# Patient Record
Sex: FEMALE | Race: WHITE | URBAN - METROPOLITAN AREA
[De-identification: names, ages, dates, MRNs, and addresses within clinical notes are randomized per-mention and may not be internally consistent; named-entity substitution may affect disease eponyms.]

---

## 2023-07-10 ENCOUNTER — NURSING HOME VISIT (OUTPATIENT)
Dept: POST ACUTE CARE | Facility: EXTERNAL LOCATION | Age: 68
End: 2023-07-10
Payer: MEDICARE

## 2023-07-10 DIAGNOSIS — I10 PRIMARY HYPERTENSION: ICD-10-CM

## 2023-07-10 DIAGNOSIS — S32.009A CLOSED FRACTURE OF LUMBAR VERTEBRA WITHOUT SPINAL CORD INJURY, INITIAL ENCOUNTER (MULTI): Primary | ICD-10-CM

## 2023-07-10 DIAGNOSIS — R53.1 WEAKNESS: ICD-10-CM

## 2023-07-10 DIAGNOSIS — F41.9 ANXIETY: ICD-10-CM

## 2023-07-10 DIAGNOSIS — F32.0 CURRENT MILD EPISODE OF MAJOR DEPRESSIVE DISORDER WITHOUT PRIOR EPISODE (CMS-HCC): ICD-10-CM

## 2023-07-10 PROCEDURE — 99305 1ST NF CARE MODERATE MDM 35: CPT | Performed by: INTERNAL MEDICINE

## 2023-07-10 NOTE — LETTER
Patient: Cece Garcia  : 1955    Encounter Date: 07/10/2023    HISTORY & PHYSICAL    Subjective  Chief complaint: Cece Garcia is a 67 y.o. female who is a acute skilled care patient being seen and evaluated for multiple medical problems.  Patient presents for weakness.    HPI:  Patient has a past medical history of anxiety, depression, and HTN. Patient was recently admitted to skilled nursing facility for wedge compression fractures in her vertebra.  The pain is severe at times it sharp it is off and on increased with movement no radiation 5-7/10 patient will be in therapy working towards her goals and gaining strength.         Past Medical History:   Diagnosis Date   • Anxiety    • Depression    • Hypertension, essential    • Weakness    • Wedge compression fracture of third lumbar vertebra with routine healing        No past surgical history on file.    Family History   Problem Relation Name Age of Onset   • No Known Problems Mother     • No Known Problems Father         Social History     Socioeconomic History   • Marital status: Single     Spouse name: Not on file   • Number of children: Not on file   • Years of education: Not on file   • Highest education level: Not on file   Occupational History   • Not on file   Tobacco Use   • Smoking status: Not on file   • Smokeless tobacco: Not on file   Substance and Sexual Activity   • Alcohol use: Not on file   • Drug use: Not on file   • Sexual activity: Not on file   Other Topics Concern   • Not on file   Social History Narrative   • Not on file     Social Determinants of Health     Financial Resource Strain: Not on file   Food Insecurity: Not on file   Transportation Needs: Not on file   Physical Activity: Not on file   Stress: Not on file   Social Connections: Not on file   Intimate Partner Violence: Not on file   Housing Stability: Not on file       Vital signs: 119/65, 97.8, 75, 19, 98%    Objective  Physical Exam  Vitals reviewed.   Constitutional:        Appearance: Normal appearance.   HENT:      Head: Normocephalic and atraumatic.   Cardiovascular:      Rate and Rhythm: Normal rate and regular rhythm.   Pulmonary:      Effort: Pulmonary effort is normal.      Breath sounds: Normal breath sounds.   Abdominal:      General: Bowel sounds are normal.      Palpations: Abdomen is soft.   Musculoskeletal:      Cervical back: Neck supple.      Comments: Tender middle back   Skin:     General: Skin is warm and dry.   Neurological:      General: No focal deficit present.      Mental Status: She is alert.   Psychiatric:         Mood and Affect: Mood normal.         Behavior: Behavior is cooperative.         Assessment/Plan  Problem List Items Addressed This Visit    None  Visit Diagnoses       Closed fracture of lumbar vertebra without spinal cord injury, initial encounter (CMS/[facility])    -  Primary    Primary hypertension        Current mild episode of major depressive disorder without prior episode (CMS/[facility])        Anxiety        Weakness              Medications, treatments, and labs reviewed  Continue medications and treatments as listed in [facility]    Scribe Attestation  Diane PENA Scribe   attest that this documentation has been prepared under the direction and in the presence of Hayley Mckee MD.    Provider Attestation - Scribe documentation  All medical record entries made by the Scribe were at my direction and personally dictated by me. I have reviewed the chart and agree that the record accurately reflects my personal performance of the history, physical exam, discussion and plan.    Hayley Mckee MD          Electronically Signed By: Hayley Mckee MD   7/10/23  2:56 PM

## 2023-07-11 ENCOUNTER — NURSING HOME VISIT (OUTPATIENT)
Dept: POST ACUTE CARE | Facility: EXTERNAL LOCATION | Age: 68
End: 2023-07-11
Payer: MEDICARE

## 2023-07-11 DIAGNOSIS — S32.030D CLOSED WEDGE COMPRESSION FRACTURE OF L3 VERTEBRA WITH ROUTINE HEALING: ICD-10-CM

## 2023-07-11 DIAGNOSIS — R53.1 WEAKNESS: Primary | ICD-10-CM

## 2023-07-11 PROCEDURE — 99308 SBSQ NF CARE LOW MDM 20: CPT | Performed by: REGISTERED NURSE

## 2023-07-11 NOTE — LETTER
Patient: Cece Garcia  : 1955    Encounter Date: 2023    PROGRESS NOTE    Subjective  Chief complaint: Cece Garcia is a 67 y.o. female who is a acute skilled care patient being seen and evaluated for weakness.    HPI:  7/10/23  Patient has a past medical history of anxiety, depression, and HTN. Patient was recently admitted to skilled nursing facility for wedge compression fractures in her vertebra.  The pain is severe at times it sharp it is off and on increased with movement no radiation -7/10 patient will be in therapy working towards her goals and gaining strength.     23   Patient admitted to SNF for therapy d/t weakness after recent hospitalization.   Patient requires assist with ADLs and transfers.  Therapy working with patient to improve strength and functional mobility.  Patient able to ambulate 42 feet with FWW and CGA.  Requires CGA for transfers and SBA for lower body ADLs.  Is moderately independent for upper body ADLs.        Objective  Vital signs:  17, 101/73, 96.8, 86, 96%  Physical Exam  Constitutional:       General: She is not in acute distress.  Eyes:      Extraocular Movements: Extraocular movements intact.   Pulmonary:      Effort: Pulmonary effort is normal.   Musculoskeletal:      Cervical back: Neck supple.   Neurological:      Mental Status: She is alert.   Psychiatric:         Mood and Affect: Mood normal.         Behavior: Behavior is cooperative.         Assessment/Plan  Problem List Items Addressed This Visit       Weakness - Primary     Cont pt/ot          Closed wedge compression fracture of L3 vertebra with routine healing     Cont current meds   FU with Ortho  Cont therapy           Medications, treatments, and labs reviewed  Continue medications and treatments as listed in EMR    Scribe Attestation  I, Zayda Roque   attest that this documentation has been prepared under the direction and in the presence of Kevin Lara,  APRN-CNP    Provider Attestation - Scribe documentation  All medical record entries made by the Scribe were at my direction and personally dictated by me. I have reviewed the chart and agree that the record accurately reflects my personal performance of the history, physical exam, discussion and plan.   JONNIE Berkowitz        Electronically Signed By: JONNIE Berkowitz   8/24/23 10:51 AM

## 2023-07-12 ENCOUNTER — NURSING HOME VISIT (OUTPATIENT)
Dept: POST ACUTE CARE | Facility: EXTERNAL LOCATION | Age: 68
End: 2023-07-12
Payer: MEDICARE

## 2023-07-12 DIAGNOSIS — S32.030D CLOSED WEDGE COMPRESSION FRACTURE OF L3 VERTEBRA WITH ROUTINE HEALING: ICD-10-CM

## 2023-07-12 DIAGNOSIS — R53.1 WEAKNESS: Primary | ICD-10-CM

## 2023-07-12 PROCEDURE — 99308 SBSQ NF CARE LOW MDM 20: CPT | Performed by: REGISTERED NURSE

## 2023-07-12 NOTE — LETTER
Patient: Cece Garcia  : 1955    Encounter Date: 2023    PROGRESS NOTE    Subjective  Chief complaint: Cece Garcia is a 67 y.o. female who is a acute skilled care patient being seen and evaluated for weakness.    HPI:  7/10/23  Patient has a past medical history of anxiety, depression, and HTN. Patient was recently admitted to skilled nursing facility for wedge compression fractures in her vertebra.  The pain is severe at times it sharp it is off and on increased with movement no radiation -7/10 patient will be in therapy working towards her goals and gaining strength.     23   Patient admitted to SNF for therapy d/t weakness after recent hospitalization.   Patient requires assist with ADLs and transfers.  Therapy working with patient to improve strength and functional mobility.  Patient able to ambulate 42 feet with FWW and CGA.  Requires CGA for transfers and SBA for lower body ADLs.  Is moderately independent for upper body ADLs.      23    Patient in therapy d/t generalized weakness.  Patient presents for f/u.  Continues to work toward goals in therapy.  Patient has no new complaints at this time.  No new concerns reported by nursing.      Objective  Vital signs: 109/66  Physical Exam  Constitutional:       General: She is not in acute distress.  Eyes:      Extraocular Movements: Extraocular movements intact.   Pulmonary:      Effort: Pulmonary effort is normal.   Musculoskeletal:      Cervical back: Neck supple.   Neurological:      Mental Status: She is alert.   Psychiatric:         Mood and Affect: Mood normal.         Behavior: Behavior is cooperative.         Assessment/Plan  Problem List Items Addressed This Visit       Weakness - Primary     Cont pt/ot          Closed wedge compression fracture of L3 vertebra with routine healing     Cont current meds   FU with Ortho  Cont therapy           Medications, treatments, and labs reviewed  Continue medications and treatments as  listed in EMR    Scribe Attestation  I, Zayda Roque   attest that this documentation has been prepared under the direction and in the presence of JONNIE Berkowitz    Provider Attestation - Scribe documentation  All medical record entries made by the Scribe were at my direction and personally dictated by me. I have reviewed the chart and agree that the record accurately reflects my personal performance of the history, physical exam, discussion and plan.   JONNIE Berkowitz        Electronically Signed By: JONNIE Berkowitz   8/28/23 11:20 AM

## 2023-07-13 ENCOUNTER — NURSING HOME VISIT (OUTPATIENT)
Dept: POST ACUTE CARE | Facility: EXTERNAL LOCATION | Age: 68
End: 2023-07-13
Payer: MEDICARE

## 2023-07-13 DIAGNOSIS — S32.030D CLOSED WEDGE COMPRESSION FRACTURE OF L3 VERTEBRA WITH ROUTINE HEALING: ICD-10-CM

## 2023-07-13 DIAGNOSIS — R53.1 WEAKNESS: ICD-10-CM

## 2023-07-13 PROCEDURE — 99308 SBSQ NF CARE LOW MDM 20: CPT | Performed by: INTERNAL MEDICINE

## 2023-07-13 NOTE — LETTER
Patient: Cece Garcia  : 1955    Encounter Date: 2023    PROGRESS NOTE    Subjective  Chief complaint: Cece Gacria is a 67 y.o. female who is an acute skilled patient being seen and evaluated for weakness    HPI:  Patient recently admitted to nursing facility for therapy. She requires assistance for transfers, ADL's and mobility. Pain associated with fxs in her spine can be severe at time but therapy and pain meds mostly effective. No new issues at this time. No acute distress.       Objective  Vital signs: 127/78, 97%    Physical Exam  Constitutional:       General: She is not in acute distress.  Eyes:      Extraocular Movements: Extraocular movements intact.   Cardiovascular:      Rate and Rhythm: Normal rate and regular rhythm.   Pulmonary:      Effort: Pulmonary effort is normal.      Breath sounds: Normal breath sounds.   Abdominal:      General: Bowel sounds are normal.      Palpations: Abdomen is soft.   Musculoskeletal:      Cervical back: Neck supple.      Right lower leg: No edema.      Left lower leg: No edema.   Neurological:      Mental Status: She is alert.   Psychiatric:         Mood and Affect: Mood normal.         Behavior: Behavior is cooperative.         Assessment/Plan  Problem List Items Addressed This Visit          Neuro    Closed wedge compression fracture of L3 vertebra with routine healing     Cont current meds   FU with Ortho  Cont therapy            Symptoms and Signs    Weakness     Continue therapy          Medications, treatments, and labs reviewed  Continue medications and treatments as listed in [facility]    Hayley Mckee MD    1. Weakness        2. Closed wedge compression fracture of L3 vertebra with routine healing             Scribe Attestation  By signing my name below, I, Sarai Rod, Scribe   attest that this documentation has been prepared under the direction and in the presence of Hayley Mckee MD.    Provider Attestation - Scribe documentation  All medical  record entries made by the Scribe were at my direction and personally dictated by me. I have reviewed the chart and agree that the record accurately reflects my personal performance of the history, physical exam, discussion and plan.  1. Weakness        2. Closed wedge compression fracture of L3 vertebra with routine healing              Electronically Signed By: Hayley Mckee MD   7/13/23  5:41 PM

## 2023-07-13 NOTE — PROGRESS NOTES
PROGRESS NOTE    Subjective   Chief complaint: Cece Garcia is a 67 y.o. female who is an acute skilled patient being seen and evaluated for weakness    HPI:  Patient recently admitted to nursing facility for therapy. She requires assistance for transfers, ADL's and mobility. Pain associated with fxs in her spine can be severe at time but therapy and pain meds mostly effective. No new issues at this time. No acute distress.       Objective   Vital signs: 127/78, 97%    Physical Exam  Constitutional:       General: She is not in acute distress.  Eyes:      Extraocular Movements: Extraocular movements intact.   Cardiovascular:      Rate and Rhythm: Normal rate and regular rhythm.   Pulmonary:      Effort: Pulmonary effort is normal.      Breath sounds: Normal breath sounds.   Abdominal:      General: Bowel sounds are normal.      Palpations: Abdomen is soft.   Musculoskeletal:      Cervical back: Neck supple.      Right lower leg: No edema.      Left lower leg: No edema.   Neurological:      Mental Status: She is alert.   Psychiatric:         Mood and Affect: Mood normal.         Behavior: Behavior is cooperative.         Assessment/Plan   Problem List Items Addressed This Visit          Neuro    Closed wedge compression fracture of L3 vertebra with routine healing     Cont current meds   FU with Ortho  Cont therapy            Symptoms and Signs    Weakness     Continue therapy          Medications, treatments, and labs reviewed  Continue medications and treatments as listed in [facility]    Hayley Mckee MD    1. Weakness        2. Closed wedge compression fracture of L3 vertebra with routine healing             Scribe Attestation  By signing my name below, Sarai PENA Scribe   attest that this documentation has been prepared under the direction and in the presence of Hayley Mckee MD.    Provider Attestation - Scribe documentation  All medical record entries made by the Scribe were at my direction and personally  dictated by me. I have reviewed the chart and agree that the record accurately reflects my personal performance of the history, physical exam, discussion and plan.  1. Weakness        2. Closed wedge compression fracture of L3 vertebra with routine healing

## 2023-07-14 ENCOUNTER — NURSING HOME VISIT (OUTPATIENT)
Dept: POST ACUTE CARE | Facility: EXTERNAL LOCATION | Age: 68
End: 2023-07-14
Payer: MEDICARE

## 2023-07-14 DIAGNOSIS — S32.030D CLOSED WEDGE COMPRESSION FRACTURE OF L3 VERTEBRA WITH ROUTINE HEALING: Primary | ICD-10-CM

## 2023-07-14 DIAGNOSIS — R53.1 WEAKNESS: ICD-10-CM

## 2023-07-14 DIAGNOSIS — I10 PRIMARY HYPERTENSION: ICD-10-CM

## 2023-07-14 PROCEDURE — 99316 NF DSCHRG MGMT 30 MIN+: CPT | Performed by: REGISTERED NURSE

## 2023-07-14 NOTE — LETTER
Patient: Cece Garcia  : 1955    Encounter Date: 2023    PROGRESS NOTE    Subjective  Chief complaint: Cece Garcia is a 67 y.o. female who is a acute skilled care patient being seen and evaluated for weakness.    HPI:  7/10/23  Patient has a past medical history of anxiety, depression, and HTN. Patient was recently admitted to skilled nursing facility for wedge compression fractures in her vertebra.  The pain is severe at times it sharp it is off and on increased with movement no radiation -7/10 patient will be in therapy working towards her goals and gaining strength.     23   Patient admitted to SNF for therapy d/t weakness after recent hospitalization.   Patient requires assist with ADLs and transfers.  Therapy working with patient to improve strength and functional mobility.  Patient able to ambulate 42 feet with FWW and CGA.  Requires CGA for transfers and SBA for lower body ADLs.  Is moderately independent for upper body ADLs.      23    Patient in therapy d/t generalized weakness.  Patient presents for f/u.  Continues to work toward goals in therapy.  Patient has no new complaints at this time.  No new concerns reported by nursing.    23  Patient recently admitted to nursing facility for therapy. She requires assistance for transfers, ADL's and mobility. Pain associated with fxs in her spine can be severe at time but therapy and pain meds mostly effective. No new issues at this time. No acute distress.     23   Patient has been working in therapy.  Planning to discharge home today .  Patient has no acute concerns or questions.  No new concerns reported by nursing.      Objective  Vital signs: 136/71  Physical Exam  Constitutional:       General: She is not in acute distress.  Eyes:      Extraocular Movements: Extraocular movements intact.   Pulmonary:      Effort: Pulmonary effort is normal.   Musculoskeletal:      Cervical back: Neck supple.      Comments: Generalized  weakness   Neurological:      Mental Status: She is alert.   Psychiatric:         Mood and Affect: Mood normal.         Behavior: Behavior is cooperative.       Admitting/DC Diagnoses  Assessment/Plan  Problem List Items Addressed This Visit       Weakness     Cont pt/ot          Closed wedge compression fracture of L3 vertebra with routine healing - Primary     Cont current meds   FU with Ortho  Cont therapy          Primary hypertension     BP at goal monitor          Prognosis - Fair  Course - PT/OT  Plan - DC home with home health nursing and therapy    Medications, treatments, and labs reviewed  Continue medications and treatments as listed in EMR    Scribe Attestation  IEva Scribe   attest that this documentation has been prepared under the direction and in the presence of JONNIE Berkowitz    Provider Attestation - Scribe documentation  All medical record entries made by the Scribe were at my direction and personally dictated by me. I have reviewed the chart and agree that the record accurately reflects my personal performance of the history, physical exam, discussion and plan.   JONNIE Berkowitz        Electronically Signed By: JONNIE Berkowitz   8/28/23  6:26 PM

## 2023-08-09 DIAGNOSIS — S32.030D CLOSED WEDGE COMPRESSION FRACTURE OF L3 VERTEBRA WITH ROUTINE HEALING: Primary | ICD-10-CM

## 2023-08-16 RX ORDER — BACLOFEN 5 MG/1
5 TABLET ORAL 3 TIMES DAILY
Qty: 90 TABLET | Refills: 0 | Status: SHIPPED | OUTPATIENT
Start: 2023-08-16 | End: 2024-03-06

## 2023-08-23 NOTE — PROGRESS NOTES
PROGRESS NOTE    Subjective   Chief complaint: Cece Garcia is a 67 y.o. female who is a acute skilled care patient being seen and evaluated for weakness.    HPI:  7/10/23  Patient has a past medical history of anxiety, depression, and HTN. Patient was recently admitted to skilled nursing facility for wedge compression fractures in her vertebra.  The pain is severe at times it sharp it is off and on increased with movement no radiation 5-7/10 patient will be in therapy working towards her goals and gaining strength.     7/11/23   Patient admitted to SNF for therapy d/t weakness after recent hospitalization.   Patient requires assist with ADLs and transfers.  Therapy working with patient to improve strength and functional mobility.  Patient able to ambulate 42 feet with FWW and CGA.  Requires CGA for transfers and SBA for lower body ADLs.  Is moderately independent for upper body ADLs.        Objective   Vital signs:  17, 101/73, 96.8, 86, 96%  Physical Exam  Constitutional:       General: She is not in acute distress.  Eyes:      Extraocular Movements: Extraocular movements intact.   Pulmonary:      Effort: Pulmonary effort is normal.   Musculoskeletal:      Cervical back: Neck supple.   Neurological:      Mental Status: She is alert.   Psychiatric:         Mood and Affect: Mood normal.         Behavior: Behavior is cooperative.         Assessment/Plan   Problem List Items Addressed This Visit       Weakness - Primary     Cont pt/ot          Closed wedge compression fracture of L3 vertebra with routine healing     Cont current meds   FU with Ortho  Cont therapy           Medications, treatments, and labs reviewed  Continue medications and treatments as listed in EMR    Scribe Attestation  I, Zayda Roque   attest that this documentation has been prepared under the direction and in the presence of JONNIE Berkowitz    Provider Attestation - Scribe documentation  All medical record entries made  by the Scribe were at my direction and personally dictated by me. I have reviewed the chart and agree that the record accurately reflects my personal performance of the history, physical exam, discussion and plan.   Kevin Lara, APRN-CNP

## 2023-08-25 ENCOUNTER — NURSING HOME VISIT (OUTPATIENT)
Dept: POST ACUTE CARE | Facility: EXTERNAL LOCATION | Age: 68
End: 2023-08-25
Payer: MEDICARE

## 2023-08-25 DIAGNOSIS — S32.030D CLOSED WEDGE COMPRESSION FRACTURE OF L3 VERTEBRA WITH ROUTINE HEALING: ICD-10-CM

## 2023-08-25 DIAGNOSIS — R53.1 WEAKNESS: Primary | ICD-10-CM

## 2023-08-25 DIAGNOSIS — M48.062 SPINAL STENOSIS OF LUMBAR REGION WITH NEUROGENIC CLAUDICATION: ICD-10-CM

## 2023-08-25 DIAGNOSIS — I10 PRIMARY HYPERTENSION: ICD-10-CM

## 2023-08-25 PROCEDURE — 99308 SBSQ NF CARE LOW MDM 20: CPT | Performed by: REGISTERED NURSE

## 2023-08-25 NOTE — PROGRESS NOTES
PROGRESS NOTE    Subjective   Chief complaint: Cece Garcia is a 67 y.o. female who is a acute skilled care patient being seen and evaluated for weakness.    HPI:  7/10/23  Patient has a past medical history of anxiety, depression, and HTN. Patient was recently admitted to skilled nursing facility for wedge compression fractures in her vertebra.  The pain is severe at times it sharp it is off and on increased with movement no radiation 5-7/10 patient will be in therapy working towards her goals and gaining strength.     7/11/23   Patient admitted to SNF for therapy d/t weakness after recent hospitalization.   Patient requires assist with ADLs and transfers.  Therapy working with patient to improve strength and functional mobility.  Patient able to ambulate 42 feet with FWW and CGA.  Requires CGA for transfers and SBA for lower body ADLs.  Is moderately independent for upper body ADLs.      7/12/23    Patient in therapy d/t generalized weakness.  Patient presents for f/u.  Continues to work toward goals in therapy.  Patient has no new complaints at this time.  No new concerns reported by nursing.      Objective   Vital signs: 109/66  Physical Exam  Constitutional:       General: She is not in acute distress.  Eyes:      Extraocular Movements: Extraocular movements intact.   Pulmonary:      Effort: Pulmonary effort is normal.   Musculoskeletal:      Cervical back: Neck supple.   Neurological:      Mental Status: She is alert.   Psychiatric:         Mood and Affect: Mood normal.         Behavior: Behavior is cooperative.         Assessment/Plan   Problem List Items Addressed This Visit       Weakness - Primary     Cont pt/ot          Closed wedge compression fracture of L3 vertebra with routine healing     Cont current meds   FU with Ortho  Cont therapy           Medications, treatments, and labs reviewed  Continue medications and treatments as listed in EMR    Scribe Attestation  I, Eva Longo , Shukriibe   attest that  this documentation has been prepared under the direction and in the presence of JONNIE Berkowitz    Provider Attestation - Scribe documentation  All medical record entries made by the Scribe were at my direction and personally dictated by me. I have reviewed the chart and agree that the record accurately reflects my personal performance of the history, physical exam, discussion and plan.   JONNIE Berkowitz

## 2023-08-25 NOTE — PROGRESS NOTES
PROGRESS NOTE    Subjective   Chief complaint: Cece Garcia is a 67 y.o. female who is a acute skilled care patient being seen and evaluated for weakness.    HPI:  7/10/23  Patient has a past medical history of anxiety, depression, and HTN. Patient was recently admitted to skilled nursing facility for wedge compression fractures in her vertebra.  The pain is severe at times it sharp it is off and on increased with movement no radiation 5-7/10 patient will be in therapy working towards her goals and gaining strength.     7/11/23   Patient admitted to SNF for therapy d/t weakness after recent hospitalization.   Patient requires assist with ADLs and transfers.  Therapy working with patient to improve strength and functional mobility.  Patient able to ambulate 42 feet with FWW and CGA.  Requires CGA for transfers and SBA for lower body ADLs.  Is moderately independent for upper body ADLs.      7/12/23    Patient in therapy d/t generalized weakness.  Patient presents for f/u.  Continues to work toward goals in therapy.  Patient has no new complaints at this time.  No new concerns reported by nursing.    7/13/23  Patient recently admitted to nursing facility for therapy. She requires assistance for transfers, ADL's and mobility. Pain associated with fxs in her spine can be severe at time but therapy and pain meds mostly effective. No new issues at this time. No acute distress.     7/14/23   Patient has been working in therapy.  Planning to discharge home today .  Patient has no acute concerns or questions.  No new concerns reported by nursing.      Objective   Vital signs: 136/71  Physical Exam  Constitutional:       General: She is not in acute distress.  Eyes:      Extraocular Movements: Extraocular movements intact.   Pulmonary:      Effort: Pulmonary effort is normal.   Musculoskeletal:      Cervical back: Neck supple.      Comments: Generalized weakness   Neurological:      Mental Status: She is alert.    Psychiatric:         Mood and Affect: Mood normal.         Behavior: Behavior is cooperative.       Admitting/DC Diagnoses  Assessment/Plan   Problem List Items Addressed This Visit       Weakness     Cont pt/ot          Closed wedge compression fracture of L3 vertebra with routine healing - Primary     Cont current meds   FU with Ortho  Cont therapy          Primary hypertension     BP at goal monitor          Prognosis - Fair  Course - PT/OT  Plan - DC home with home health nursing and therapy    Medications, treatments, and labs reviewed  Continue medications and treatments as listed in EMR    Scribe Attestation  IEva Scribe   attest that this documentation has been prepared under the direction and in the presence of JONNIE Berkowitz    Provider Attestation - Scribe documentation  All medical record entries made by the Scribe were at my direction and personally dictated by me. I have reviewed the chart and agree that the record accurately reflects my personal performance of the history, physical exam, discussion and plan.   JONNIE Berkowitz

## 2023-08-25 NOTE — LETTER
Patient: Cece Garcia  : 1955    Encounter Date: 2023    PROGRESS NOTE    Subjective  Chief complaint: Cece Garcia is a 67 y.o. female who is an acute skilled patient being seen and evaluated for weakness    HPI:  23 Patient in therapy d/t generalized weakness.  Patient presents for f/u.  Continues to work toward goals in therapy.  No new complaints at this time.      Objective  Vital signs:  109 / 63    Physical Exam  Constitutional:       General: She is not in acute distress.  Eyes:      Extraocular Movements: Extraocular movements intact.   Pulmonary:      Effort: Pulmonary effort is normal.   Musculoskeletal:      Cervical back: Neck supple.   Neurological:      Mental Status: She is alert.   Psychiatric:         Mood and Affect: Mood normal.         Behavior: Behavior is cooperative.         Assessment/Plan  Problem List Items Addressed This Visit       Weakness - Primary     Cont pt/ot          Closed wedge compression fracture of L3 vertebra with routine healing     Cont current meds   FU with Ortho  Cont therapy          Spinal stenosis of lumbar region with neurogenic claudication    Primary hypertension     BP at goal monitor          Medications, treatments, and labs reviewed  Continue medications and treatments as listed in [facility]    Scribe Attestation  IDiane Scribe   attest that this documentation has been prepared under the direction and in the presence of JONNIE Berkowitz.    Provider Attestation - Scribe documentation  All medical record entries made by the Scribe were at my direction and personally dictated by me. I have reviewed the chart and agree that the record accurately reflects my personal performance of the history, physical exam, discussion and plan.    JONNIE Berkowitz        Electronically Signed By: JONNIE Berkowitz   23  5:17 PM

## 2023-08-28 ENCOUNTER — NURSING HOME VISIT (OUTPATIENT)
Dept: POST ACUTE CARE | Facility: EXTERNAL LOCATION | Age: 68
End: 2023-08-28
Payer: MEDICARE

## 2023-08-28 DIAGNOSIS — S32.030D CLOSED WEDGE COMPRESSION FRACTURE OF L3 VERTEBRA WITH ROUTINE HEALING: Primary | ICD-10-CM

## 2023-08-28 DIAGNOSIS — R53.1 WEAKNESS: ICD-10-CM

## 2023-08-28 PROBLEM — I10 PRIMARY HYPERTENSION: Status: ACTIVE | Noted: 2023-08-28

## 2023-08-28 PROBLEM — M47.26 OSTEOARTHRITIS OF SPINE WITH RADICULOPATHY, LUMBAR REGION: Status: ACTIVE | Noted: 2023-08-28

## 2023-08-28 PROBLEM — M48.062 SPINAL STENOSIS OF LUMBAR REGION WITH NEUROGENIC CLAUDICATION: Status: ACTIVE | Noted: 2023-08-28

## 2023-08-28 PROCEDURE — 99305 1ST NF CARE MODERATE MDM 35: CPT | Performed by: INTERNAL MEDICINE

## 2023-08-28 NOTE — LETTER
Patient: Cece Garcia  : 1955    Encounter Date: 2023    HISTORY & PHYSICAL    Subjective  Chief complaint: Cece Garcia is a 67 y.o. female who is a acute skilled care patient being seen and evaluated for multiple medical problems.  Patient presents for weakness.    HPI:  Patient is a 67 year old female with a past medical history of back pain, sciatica, HTN, anxiety, and weakness. Patient was admitted to SNF for back pain. Patient is skilled and working towards goals in therapy.         Past Medical History:   Diagnosis Date   • Anxiety    • Depression    • Hypertension, essential    • Weakness    • Wedge compression fracture of third lumbar vertebra with routine healing        No past surgical history on file.    Family History   Problem Relation Name Age of Onset   • No Known Problems Mother     • No Known Problems Father         Social History     Socioeconomic History   • Marital status: Single     Spouse name: Not on file   • Number of children: Not on file   • Years of education: Not on file   • Highest education level: Not on file   Occupational History   • Not on file   Tobacco Use   • Smoking status: Not on file   • Smokeless tobacco: Not on file   Substance and Sexual Activity   • Alcohol use: Not on file   • Drug use: Not on file   • Sexual activity: Not on file   Other Topics Concern   • Not on file   Social History Narrative   • Not on file     Social Determinants of Health     Financial Resource Strain: Not on file   Food Insecurity: Not on file   Transportation Needs: Not on file   Physical Activity: Not on file   Stress: Not on file   Social Connections: Not on file   Intimate Partner Violence: Not on file   Housing Stability: Not on file       Vital signs: 138/67, 97, 60, 16, 99%    Objective  Physical Exam  Vitals reviewed.   Constitutional:       Appearance: Normal appearance.   HENT:      Head: Normocephalic and atraumatic.   Cardiovascular:      Rate and Rhythm: Normal rate  and regular rhythm.   Pulmonary:      Effort: Pulmonary effort is normal.      Breath sounds: Normal breath sounds.   Abdominal:      General: Bowel sounds are normal.      Palpations: Abdomen is soft.   Musculoskeletal:      Cervical back: Neck supple.   Skin:     General: Skin is warm and dry.   Neurological:      General: No focal deficit present.      Mental Status: She is alert.   Psychiatric:         Mood and Affect: Mood normal.         Behavior: Behavior is cooperative.         Assessment/Plan  Problem List Items Addressed This Visit       Weakness    Closed wedge compression fracture of L3 vertebra with routine healing - Primary     Medications, treatments, and labs reviewed  Continue medications and treatments as listed in [facility]    Scribe Attestation  I, Diane Guerra Scribgopal   attest that this documentation has been prepared under the direction and in the presence of Hayley Mckee MD.    Provider Attestation - Scribe documentation  All medical record entries made by the Scribe were at my direction and personally dictated by me. I have reviewed the chart and agree that the record accurately reflects my personal performance of the history, physical exam, discussion and plan.    Hayley Mckee MD          Electronically Signed By: Hayley Mckee MD   8/28/23  5:57 PM

## 2023-08-28 NOTE — PROGRESS NOTES
HISTORY & PHYSICAL    Subjective   Chief complaint: Cece Garcia is a 67 y.o. female who is a acute skilled care patient being seen and evaluated for multiple medical problems.  Patient presents for weakness.    HPI:  Patient is a 67 year old female with a past medical history of back pain, sciatica, HTN, anxiety, and weakness. Patient was admitted to SNF for back pain. Patient is skilled and working towards goals in therapy.         Past Medical History:   Diagnosis Date    Anxiety     Depression     Hypertension, essential     Weakness     Wedge compression fracture of third lumbar vertebra with routine healing        No past surgical history on file.    Family History   Problem Relation Name Age of Onset    No Known Problems Mother      No Known Problems Father         Social History     Socioeconomic History    Marital status: Single     Spouse name: Not on file    Number of children: Not on file    Years of education: Not on file    Highest education level: Not on file   Occupational History    Not on file   Tobacco Use    Smoking status: Not on file    Smokeless tobacco: Not on file   Substance and Sexual Activity    Alcohol use: Not on file    Drug use: Not on file    Sexual activity: Not on file   Other Topics Concern    Not on file   Social History Narrative    Not on file     Social Determinants of Health     Financial Resource Strain: Not on file   Food Insecurity: Not on file   Transportation Needs: Not on file   Physical Activity: Not on file   Stress: Not on file   Social Connections: Not on file   Intimate Partner Violence: Not on file   Housing Stability: Not on file       Vital signs: 138/67, 97, 60, 16, 99%    Objective   Physical Exam  Vitals reviewed.   Constitutional:       Appearance: Normal appearance.   HENT:      Head: Normocephalic and atraumatic.   Cardiovascular:      Rate and Rhythm: Normal rate and regular rhythm.   Pulmonary:      Effort: Pulmonary effort is normal.      Breath  sounds: Normal breath sounds.   Abdominal:      General: Bowel sounds are normal.      Palpations: Abdomen is soft.   Musculoskeletal:      Cervical back: Neck supple.   Skin:     General: Skin is warm and dry.   Neurological:      General: No focal deficit present.      Mental Status: She is alert.   Psychiatric:         Mood and Affect: Mood normal.         Behavior: Behavior is cooperative.         Assessment/Plan   Problem List Items Addressed This Visit       Weakness    Closed wedge compression fracture of L3 vertebra with routine healing - Primary     Medications, treatments, and labs reviewed  Continue medications and treatments as listed in [facility]    Scribe Attestation  I, Zayda Varner   attest that this documentation has been prepared under the direction and in the presence of Hayley Mckee MD.    Provider Attestation - Scribe documentation  All medical record entries made by the Scribe were at my direction and personally dictated by me. I have reviewed the chart and agree that the record accurately reflects my personal performance of the history, physical exam, discussion and plan.    Hayley Mckee MD

## 2023-08-30 ENCOUNTER — NURSING HOME VISIT (OUTPATIENT)
Dept: POST ACUTE CARE | Facility: EXTERNAL LOCATION | Age: 68
End: 2023-08-30
Payer: MEDICARE

## 2023-08-30 DIAGNOSIS — S32.030D CLOSED WEDGE COMPRESSION FRACTURE OF L3 VERTEBRA WITH ROUTINE HEALING: ICD-10-CM

## 2023-08-30 DIAGNOSIS — I10 PRIMARY HYPERTENSION: ICD-10-CM

## 2023-08-30 DIAGNOSIS — M47.26 OSTEOARTHRITIS OF SPINE WITH RADICULOPATHY, LUMBAR REGION: ICD-10-CM

## 2023-08-30 DIAGNOSIS — R53.1 WEAKNESS: Primary | ICD-10-CM

## 2023-08-30 PROCEDURE — 99309 SBSQ NF CARE MODERATE MDM 30: CPT | Performed by: REGISTERED NURSE

## 2023-08-30 NOTE — LETTER
Patient: Cece Garcia  : 1955    Encounter Date: 2023    PROGRESS NOTE    Subjective  Chief complaint: Cece Garica is a 67 y.o. female who is an acute skilled patient being seen and evaluated for weakness    HPI:  23 Patient is a 67 year old female with a past medical history of back pain, sciatica, HTN, anxiety, and weakness. Patient was admitted to SNF for back pain. Patient is skilled and working towards goals in therapy.     23 Patient admitted to SNF for therapy d/t weakness after recent hospitalization.   Patient requires assist with ADLs and transfers.  No new complaints.        Objective  Vital signs: 132/76    Physical Exam  Constitutional:       General: She is not in acute distress.  Eyes:      Extraocular Movements: Extraocular movements intact.   Pulmonary:      Effort: Pulmonary effort is normal.   Musculoskeletal:      Cervical back: Neck supple.   Neurological:      Mental Status: She is alert.   Psychiatric:         Mood and Affect: Mood normal.         Behavior: Behavior is cooperative.         Assessment/Plan  Problem List Items Addressed This Visit       Weakness - Primary     Cont pt/ot          Closed wedge compression fracture of L3 vertebra with routine healing     Cont current meds   FU with Ortho  Cont therapy          Osteoarthritis of spine with radiculopathy, lumbar region     Pain controlled   continue current meds         Primary hypertension     BP at goal monitor          Medications, treatments, and labs reviewed  Continue medications and treatments as listed in [facility]    Scribe Attestation  IDiane Scribe   attest that this documentation has been prepared under the direction and in the presence of JONNIE Berkowitz.    Provider Attestation - Scribe documentation  All medical record entries made by the Scribe were at my direction and personally dictated by me. I have reviewed the chart and agree that the record accurately reflects my  personal performance of the history, physical exam, discussion and plan.    JONNIE Berkowitz        Electronically Signed By: JONNIE Berkowitz   9/28/23 11:37 AM

## 2023-08-31 ENCOUNTER — NURSING HOME VISIT (OUTPATIENT)
Dept: POST ACUTE CARE | Facility: EXTERNAL LOCATION | Age: 68
End: 2023-08-31
Payer: MEDICARE

## 2023-08-31 DIAGNOSIS — I10 PRIMARY HYPERTENSION: ICD-10-CM

## 2023-08-31 DIAGNOSIS — R53.1 WEAKNESS: Primary | ICD-10-CM

## 2023-08-31 DIAGNOSIS — S32.030D CLOSED WEDGE COMPRESSION FRACTURE OF L3 VERTEBRA WITH ROUTINE HEALING: ICD-10-CM

## 2023-08-31 PROCEDURE — 99308 SBSQ NF CARE LOW MDM 20: CPT | Performed by: INTERNAL MEDICINE

## 2023-08-31 NOTE — LETTER
Patient: Cece Garcia  : 1955    Encounter Date: 2023    PROGRESS NOTE    Subjective  Chief complaint: Cece Garcia is a 67 y.o. female who is an acute skilled patient being seen and evaluated for weakness    HPI:  Patient continues to work I therapy due to weakness. Requires assistance for transfers and ADL's. Patient with complaints of increased back pain due to compression fracture. She has an order for Oxy 10mg Q 8 hours and PRN Baclofen. No other concerns today.       Objective  Vital signs: 123/76, 98%    Physical Exam  Constitutional:       General: She is not in acute distress.  Eyes:      Extraocular Movements: Extraocular movements intact.   Cardiovascular:      Rate and Rhythm: Normal rate and regular rhythm.   Pulmonary:      Effort: Pulmonary effort is normal.      Breath sounds: Normal breath sounds.   Abdominal:      General: Bowel sounds are normal.      Palpations: Abdomen is soft.   Musculoskeletal:      Cervical back: Neck supple.      Right lower leg: No edema.      Left lower leg: No edema.   Neurological:      Mental Status: She is alert.   Psychiatric:         Mood and Affect: Mood normal.         Behavior: Behavior is cooperative.         Assessment/Plan  Problem List Items Addressed This Visit    None    Medications, treatments, and labs reviewed  Continue medications and treatments as listed in [facility]    Scribe Attestation  By signing my name below, ISarai Scribe   attest that this documentation has been prepared under the direction and in the presence of Hayley Mckee MD.    Provider Attestation - Scribe documentation  All medical record entries made by the Scribe were at my direction and personally dictated by me. I have reviewed the chart and agree that the record accurately reflects my personal performance of the history, physical exam, discussion and plan.  1. Weakness        2. Primary hypertension        3. Closed wedge compression fracture of L3 vertebra  with routine healing              Electronically Signed By: Hayley Mckee MD   9/1/23 10:25 AM

## 2023-09-01 NOTE — PROGRESS NOTES
PROGRESS NOTE    Subjective   Chief complaint: Cece Garcia is a 67 y.o. female who is an acute skilled patient being seen and evaluated for weakness    HPI:  Patient continues to work I therapy due to weakness. Requires assistance for transfers and ADL's. Patient with complaints of increased back pain due to compression fracture. She has an order for Oxy 10mg Q 8 hours and PRN Baclofen. No other concerns today.       Objective   Vital signs: 123/76, 98%    Physical Exam  Constitutional:       General: She is not in acute distress.  Eyes:      Extraocular Movements: Extraocular movements intact.   Cardiovascular:      Rate and Rhythm: Normal rate and regular rhythm.   Pulmonary:      Effort: Pulmonary effort is normal.      Breath sounds: Normal breath sounds.   Abdominal:      General: Bowel sounds are normal.      Palpations: Abdomen is soft.   Musculoskeletal:      Cervical back: Neck supple.      Right lower leg: No edema.      Left lower leg: No edema.   Neurological:      Mental Status: She is alert.   Psychiatric:         Mood and Affect: Mood normal.         Behavior: Behavior is cooperative.         Assessment/Plan   Problem List Items Addressed This Visit    None    Medications, treatments, and labs reviewed  Continue medications and treatments as listed in [facility]    Scribe Attestation  By signing my name below, I, Zayda Washington   attest that this documentation has been prepared under the direction and in the presence of Hayley Mckee MD.    Provider Attestation - Scribe documentation  All medical record entries made by the Scribe were at my direction and personally dictated by me. I have reviewed the chart and agree that the record accurately reflects my personal performance of the history, physical exam, discussion and plan.  1. Weakness        2. Primary hypertension        3. Closed wedge compression fracture of L3 vertebra with routine healing

## 2023-09-05 ENCOUNTER — NURSING HOME VISIT (OUTPATIENT)
Dept: POST ACUTE CARE | Facility: EXTERNAL LOCATION | Age: 68
End: 2023-09-05
Payer: MEDICARE

## 2023-09-05 DIAGNOSIS — I10 PRIMARY HYPERTENSION: Primary | ICD-10-CM

## 2023-09-05 DIAGNOSIS — S32.030D CLOSED WEDGE COMPRESSION FRACTURE OF L3 VERTEBRA WITH ROUTINE HEALING: ICD-10-CM

## 2023-09-05 DIAGNOSIS — R53.1 WEAKNESS: ICD-10-CM

## 2023-09-05 DIAGNOSIS — M47.26 OSTEOARTHRITIS OF SPINE WITH RADICULOPATHY, LUMBAR REGION: ICD-10-CM

## 2023-09-05 PROCEDURE — 99309 SBSQ NF CARE MODERATE MDM 30: CPT | Performed by: REGISTERED NURSE

## 2023-09-05 NOTE — LETTER
Patient: Cece Garcia  : 1955    Encounter Date: 2023    PROGRESS NOTE    Subjective  Chief complaint: Cece Garcia is a 67 y.o. female who is an acute skilled patient being seen and evaluated for weakness    HPI:  23 Patient seen and examined at bedside.  Patient denies n/v/f/c.  Continues working in therapy.  No new complaints.      Objective  Vital signs: 123/76     Physical Exam  Constitutional:       General: She is not in acute distress.  Eyes:      Extraocular Movements: Extraocular movements intact.   Pulmonary:      Effort: Pulmonary effort is normal.   Musculoskeletal:      Cervical back: Neck supple.   Neurological:      Mental Status: She is alert.   Psychiatric:         Mood and Affect: Mood normal.         Behavior: Behavior is cooperative.         Assessment/Plan  Problem List Items Addressed This Visit       Weakness     Cont pt/ot          Closed wedge compression fracture of L3 vertebra with routine healing     Cont current meds   FU with Ortho  Cont therapy          Osteoarthritis of spine with radiculopathy, lumbar region     Pain controlled   continue current meds         Primary hypertension - Primary     BP at goal monitor          Medications, treatments, and labs reviewed  Continue medications and treatments as listed in [facility]    Scribe Attestation  IDiane Scribe   attest that this documentation has been prepared under the direction and in the presence of JONNIE Berkowitz.    Provider Attestation - Scribe documentation  All medical record entries made by the Scribe were at my direction and personally dictated by me. I have reviewed the chart and agree that the record accurately reflects my personal performance of the history, physical exam, discussion and plan.    JONNIE Berkowitz        Electronically Signed By: JONNIE Berkowitz   10/19/23 10:42 AM

## 2023-09-06 ENCOUNTER — NURSING HOME VISIT (OUTPATIENT)
Dept: POST ACUTE CARE | Facility: EXTERNAL LOCATION | Age: 68
End: 2023-09-06
Payer: MEDICARE

## 2023-09-06 DIAGNOSIS — I10 PRIMARY HYPERTENSION: ICD-10-CM

## 2023-09-06 DIAGNOSIS — S32.030D CLOSED WEDGE COMPRESSION FRACTURE OF L3 VERTEBRA WITH ROUTINE HEALING: ICD-10-CM

## 2023-09-06 DIAGNOSIS — R53.1 WEAKNESS: Primary | ICD-10-CM

## 2023-09-06 PROCEDURE — 99308 SBSQ NF CARE LOW MDM 20: CPT | Performed by: REGISTERED NURSE

## 2023-09-06 NOTE — LETTER
Patient: Cece Garcia  : 1955    Encounter Date: 2023    PROGRESS NOTE    Subjective  Chief complaint: Cece Garcia is a 67 y.o. female who is an acute skilled patient being seen and evaluated for weakness    HPI:  23 Patient is a 67 year old female with a past medical history of back pain, sciatica, HTN, anxiety, and weakness. Patient was admitted to SNF for back pain. Patient is skilled and working towards goals in therapy.     23 Patient admitted to SNF for therapy d/t weakness after recent hospitalization.   Patient requires assist with ADLs and transfers.  No new complaints.      23 Therapy has been working with the patient to improve strength and endurance with ADLs, transfers, and mobility.  Patient continues to work toward goals.  Patient is stable and has no new complaints.  Nursing staff voices no new concerns today.      Objective  Vital signs: 124/80, 97.8, 79, 100%    Physical Exam  Constitutional:       General: She is not in acute distress.  Eyes:      Extraocular Movements: Extraocular movements intact.   Pulmonary:      Effort: Pulmonary effort is normal.   Musculoskeletal:      Cervical back: Neck supple.   Neurological:      Mental Status: She is alert.   Psychiatric:         Mood and Affect: Mood normal.         Behavior: Behavior is cooperative.         Assessment/Plan  Problem List Items Addressed This Visit       Weakness - Primary     Cont pt/ot          Closed wedge compression fracture of L3 vertebra with routine healing     Cont current meds   FU with Ortho  Cont therapy          Primary hypertension     BP at goal monitor          Medications, treatments, and labs reviewed  Continue medications and treatments as listed in [facility]    Scribe Attestation  I, Zayda Varner   attest that this documentation has been prepared under the direction and in the presence of JONNIE Berkowitz.    Provider Attestation - Scribe documentation  All medical  record entries made by the Scribe were at my direction and personally dictated by me. I have reviewed the chart and agree that the record accurately reflects my personal performance of the history, physical exam, discussion and plan.    JONNIE Berkowitz        Electronically Signed By: JONNIE Berkowitz   10/5/23 10:22 AM

## 2023-09-07 ENCOUNTER — NURSING HOME VISIT (OUTPATIENT)
Dept: POST ACUTE CARE | Facility: EXTERNAL LOCATION | Age: 68
End: 2023-09-07
Payer: MEDICARE

## 2023-09-07 DIAGNOSIS — R53.1 WEAKNESS: ICD-10-CM

## 2023-09-07 DIAGNOSIS — M47.26 OSTEOARTHRITIS OF SPINE WITH RADICULOPATHY, LUMBAR REGION: ICD-10-CM

## 2023-09-07 DIAGNOSIS — I10 PRIMARY HYPERTENSION: Primary | ICD-10-CM

## 2023-09-07 DIAGNOSIS — S32.030D CLOSED WEDGE COMPRESSION FRACTURE OF L3 VERTEBRA WITH ROUTINE HEALING: ICD-10-CM

## 2023-09-07 PROCEDURE — 99309 SBSQ NF CARE MODERATE MDM 30: CPT | Performed by: INTERNAL MEDICINE

## 2023-09-07 NOTE — LETTER
Patient: Cece Garcia  : 1955    Encounter Date: 2023    PROGRESS NOTE    Subjective  Chief complaint: Cece Garcia is a 67 y.o. female who is a long term care patient being seen and evaluated for monthly general medical care and follow-up.    HPI:  Patient presents for general medical care and f/u.  Patient seen and examined at bedside.  No issues per nursing.  Patient has no acute complaints.   patient continues to work in therapy.  Requires assistance with transfers ADLs and mobility.  Pain from recent fracture is controlled with current medications.   HTN BP at goal.  Denies chest pain and headache.  Patient with osteoarthritis, denies pain or inflammation at this time.  No acute distress.      Objective  Vital signs: 127/74, 98%    Physical Exam  Constitutional:       General: She is not in acute distress.  Eyes:      Extraocular Movements: Extraocular movements intact.   Cardiovascular:      Rate and Rhythm: Normal rate and regular rhythm.   Pulmonary:      Effort: Pulmonary effort is normal.      Breath sounds: Normal breath sounds.   Abdominal:      General: Bowel sounds are normal.      Palpations: Abdomen is soft.   Musculoskeletal:      Cervical back: Neck supple.      Right lower leg: No edema.      Left lower leg: No edema.   Neurological:      Mental Status: She is alert.   Psychiatric:         Mood and Affect: Mood normal.         Behavior: Behavior is cooperative.         Assessment/Plan  Problem List Items Addressed This Visit       Weakness     Cont pt/ot          Closed wedge compression fracture of L3 vertebra with routine healing     Continue Oxy PRN  Continue Baclofen PRN  Medrol dose pack.   Neurology following         Osteoarthritis of spine with radiculopathy, lumbar region     Pain controlled   continue current meds         Primary hypertension - Primary     BP at goal monitor          Medications, treatments, and labs reviewed  Continue medications and treatments as listed  in [facility]    Scribe Attestation  By signing my name below, I, Zayda Washington   attest that this documentation has been prepared under the direction and in the presence of Hayley Mckee MD.    Provider Attestation - Scribe documentation  All medical record entries made by the Scribe were at my direction and personally dictated by me. I have reviewed the chart and agree that the record accurately reflects my personal performance of the history, physical exam, discussion and plan.    1. Primary hypertension        2. Closed wedge compression fracture of L3 vertebra with routine healing        3. Osteoarthritis of spine with radiculopathy, lumbar region        4. Weakness               Electronically Signed By: Hayley Mckee MD   9/8/23  5:43 PM

## 2023-09-08 NOTE — PROGRESS NOTES
PROGRESS NOTE    Subjective   Chief complaint: Cece Garcia is a 67 y.o. female who is a long term care patient being seen and evaluated for monthly general medical care and follow-up.    HPI:  Patient presents for general medical care and f/u.  Patient seen and examined at bedside.  No issues per nursing.  Patient has no acute complaints.   patient continues to work in therapy.  Requires assistance with transfers ADLs and mobility.  Pain from recent fracture is controlled with current medications.   HTN BP at goal.  Denies chest pain and headache.  Patient with osteoarthritis, denies pain or inflammation at this time.  No acute distress.      Objective   Vital signs: 127/74, 98%    Physical Exam  Constitutional:       General: She is not in acute distress.  Eyes:      Extraocular Movements: Extraocular movements intact.   Cardiovascular:      Rate and Rhythm: Normal rate and regular rhythm.   Pulmonary:      Effort: Pulmonary effort is normal.      Breath sounds: Normal breath sounds.   Abdominal:      General: Bowel sounds are normal.      Palpations: Abdomen is soft.   Musculoskeletal:      Cervical back: Neck supple.      Right lower leg: No edema.      Left lower leg: No edema.   Neurological:      Mental Status: She is alert.   Psychiatric:         Mood and Affect: Mood normal.         Behavior: Behavior is cooperative.         Assessment/Plan   Problem List Items Addressed This Visit       Weakness     Cont pt/ot          Closed wedge compression fracture of L3 vertebra with routine healing     Continue Oxy PRN  Continue Baclofen PRN  Medrol dose pack.   Neurology following         Osteoarthritis of spine with radiculopathy, lumbar region     Pain controlled   continue current meds         Primary hypertension - Primary     BP at goal monitor          Medications, treatments, and labs reviewed  Continue medications and treatments as listed in [facility]    Scribe Attestation  By signing my name below, ISarai  Zayda Rod   attest that this documentation has been prepared under the direction and in the presence of Hayley Mckee MD.    Provider Attestation - Scribe documentation  All medical record entries made by the Scribe were at my direction and personally dictated by me. I have reviewed the chart and agree that the record accurately reflects my personal performance of the history, physical exam, discussion and plan.    1. Primary hypertension        2. Closed wedge compression fracture of L3 vertebra with routine healing        3. Osteoarthritis of spine with radiculopathy, lumbar region        4. Weakness

## 2023-09-22 NOTE — PROGRESS NOTES
PROGRESS NOTE    Subjective   Chief complaint: Cece Garcia is a 67 y.o. female who is an acute skilled patient being seen and evaluated for weakness    HPI:  8/28/23 Patient is a 67 year old female with a past medical history of back pain, sciatica, HTN, anxiety, and weakness. Patient was admitted to SNF for back pain. Patient is skilled and working towards goals in therapy.     8/30/23 Patient admitted to SNF for therapy d/t weakness after recent hospitalization.   Patient requires assist with ADLs and transfers.  No new complaints.        Objective   Vital signs: 132/76    Physical Exam  Constitutional:       General: She is not in acute distress.  Eyes:      Extraocular Movements: Extraocular movements intact.   Pulmonary:      Effort: Pulmonary effort is normal.   Musculoskeletal:      Cervical back: Neck supple.   Neurological:      Mental Status: She is alert.   Psychiatric:         Mood and Affect: Mood normal.         Behavior: Behavior is cooperative.         Assessment/Plan   Problem List Items Addressed This Visit       Weakness - Primary     Cont pt/ot          Closed wedge compression fracture of L3 vertebra with routine healing     Cont current meds   FU with Ortho  Cont therapy          Osteoarthritis of spine with radiculopathy, lumbar region     Pain controlled   continue current meds         Primary hypertension     BP at goal monitor          Medications, treatments, and labs reviewed  Continue medications and treatments as listed in [facility]    Scribe Attestation  I, Zayda Varner   attest that this documentation has been prepared under the direction and in the presence of JONNIE Berkowitz.    Provider Attestation - Scribe documentation  All medical record entries made by the Scribe were at my direction and personally dictated by me. I have reviewed the chart and agree that the record accurately reflects my personal performance of the history, physical exam, discussion and  plan.    Kevin Lara, APRN-CNP

## 2023-09-25 NOTE — PROGRESS NOTES
PROGRESS NOTE    Subjective   Chief complaint: Cece Garcia is a 67 y.o. female who is an acute skilled patient being seen and evaluated for weakness    HPI:  8/25/23 Patient in therapy d/t generalized weakness.  Patient presents for f/u.  Continues to work toward goals in therapy.  No new complaints at this time.      Objective   Vital signs:  109 / 63    Physical Exam  Constitutional:       General: She is not in acute distress.  Eyes:      Extraocular Movements: Extraocular movements intact.   Pulmonary:      Effort: Pulmonary effort is normal.   Musculoskeletal:      Cervical back: Neck supple.   Neurological:      Mental Status: She is alert.   Psychiatric:         Mood and Affect: Mood normal.         Behavior: Behavior is cooperative.         Assessment/Plan   Problem List Items Addressed This Visit       Weakness - Primary     Cont pt/ot          Closed wedge compression fracture of L3 vertebra with routine healing     Cont current meds   FU with Ortho  Cont therapy          Spinal stenosis of lumbar region with neurogenic claudication    Primary hypertension     BP at goal monitor          Medications, treatments, and labs reviewed  Continue medications and treatments as listed in [facility]    Scribe Attestation  IDiane Scribe   attest that this documentation has been prepared under the direction and in the presence of JONNIE Berkowitz.    Provider Attestation - Scribe documentation  All medical record entries made by the Scribe were at my direction and personally dictated by me. I have reviewed the chart and agree that the record accurately reflects my personal performance of the history, physical exam, discussion and plan.    JONNIE Berkowitz

## 2023-10-02 NOTE — PROGRESS NOTES
PROGRESS NOTE    Subjective   Chief complaint: Cece Garcia is a 67 y.o. female who is an acute skilled patient being seen and evaluated for weakness    HPI:  8/28/23 Patient is a 67 year old female with a past medical history of back pain, sciatica, HTN, anxiety, and weakness. Patient was admitted to SNF for back pain. Patient is skilled and working towards goals in therapy.     8/30/23 Patient admitted to SNF for therapy d/t weakness after recent hospitalization.   Patient requires assist with ADLs and transfers.  No new complaints.      9/6/23 Therapy has been working with the patient to improve strength and endurance with ADLs, transfers, and mobility.  Patient continues to work toward goals.  Patient is stable and has no new complaints.  Nursing staff voices no new concerns today.      Objective   Vital signs: 124/80, 97.8, 79, 100%    Physical Exam  Constitutional:       General: She is not in acute distress.  Eyes:      Extraocular Movements: Extraocular movements intact.   Pulmonary:      Effort: Pulmonary effort is normal.   Musculoskeletal:      Cervical back: Neck supple.   Neurological:      Mental Status: She is alert.   Psychiatric:         Mood and Affect: Mood normal.         Behavior: Behavior is cooperative.         Assessment/Plan   Problem List Items Addressed This Visit       Weakness - Primary     Cont pt/ot          Closed wedge compression fracture of L3 vertebra with routine healing     Cont current meds   FU with Ortho  Cont therapy          Primary hypertension     BP at goal monitor          Medications, treatments, and labs reviewed  Continue medications and treatments as listed in [facility]    Scribe Attestation  I, Zayda Varner   attest that this documentation has been prepared under the direction and in the presence of JONNIE Berkowitz.    Provider Attestation - Scribe documentation  All medical record entries made by the Scribe were at my direction and personally  dictated by me. I have reviewed the chart and agree that the record accurately reflects my personal performance of the history, physical exam, discussion and plan.    Kevin Lara, APRN-CNP

## 2023-10-16 NOTE — PROGRESS NOTES
PROGRESS NOTE    Subjective   Chief complaint: Cece Garcia is a 67 y.o. female who is an acute skilled patient being seen and evaluated for weakness    HPI:  9/5/23 Patient seen and examined at bedside.  Patient denies n/v/f/c.  Continues working in therapy.  No new complaints.      Objective   Vital signs: 123/76     Physical Exam  Constitutional:       General: She is not in acute distress.  Eyes:      Extraocular Movements: Extraocular movements intact.   Pulmonary:      Effort: Pulmonary effort is normal.   Musculoskeletal:      Cervical back: Neck supple.   Neurological:      Mental Status: She is alert.   Psychiatric:         Mood and Affect: Mood normal.         Behavior: Behavior is cooperative.         Assessment/Plan   Problem List Items Addressed This Visit       Weakness     Cont pt/ot          Closed wedge compression fracture of L3 vertebra with routine healing     Cont current meds   FU with Ortho  Cont therapy          Osteoarthritis of spine with radiculopathy, lumbar region     Pain controlled   continue current meds         Primary hypertension - Primary     BP at goal monitor          Medications, treatments, and labs reviewed  Continue medications and treatments as listed in [facility]    Scribe Attestation  IDiane Scribe   attest that this documentation has been prepared under the direction and in the presence of JONNIE Berkowitz.    Provider Attestation - Scribe documentation  All medical record entries made by the Scribe were at my direction and personally dictated by me. I have reviewed the chart and agree that the record accurately reflects my personal performance of the history, physical exam, discussion and plan.    JONNIE Berkowitz

## 2024-03-05 DIAGNOSIS — S32.030D CLOSED WEDGE COMPRESSION FRACTURE OF L3 VERTEBRA WITH ROUTINE HEALING: ICD-10-CM

## 2024-03-06 RX ORDER — BACLOFEN 5 MG/1
5 TABLET ORAL 3 TIMES DAILY
Qty: 90 TABLET | Refills: 0 | Status: SHIPPED | OUTPATIENT
Start: 2024-03-06 | End: 2024-04-08

## 2024-04-07 DIAGNOSIS — S32.030D CLOSED WEDGE COMPRESSION FRACTURE OF L3 VERTEBRA WITH ROUTINE HEALING: ICD-10-CM

## 2024-04-08 RX ORDER — BACLOFEN 5 MG/1
5 TABLET ORAL 3 TIMES DAILY
Qty: 90 TABLET | Refills: 0 | Status: SHIPPED | OUTPATIENT
Start: 2024-04-08 | End: 2024-05-10

## 2024-05-10 DIAGNOSIS — S32.030D CLOSED WEDGE COMPRESSION FRACTURE OF L3 VERTEBRA WITH ROUTINE HEALING: ICD-10-CM

## 2024-05-10 RX ORDER — BACLOFEN 5 MG/1
5 TABLET ORAL 3 TIMES DAILY
Qty: 90 TABLET | Refills: 0 | Status: SHIPPED | OUTPATIENT
Start: 2024-05-10